# Patient Record
Sex: MALE | Employment: FULL TIME | URBAN - METROPOLITAN AREA
[De-identification: names, ages, dates, MRNs, and addresses within clinical notes are randomized per-mention and may not be internally consistent; named-entity substitution may affect disease eponyms.]

---

## 2018-04-06 ENCOUNTER — OFFICE VISIT (OUTPATIENT)
Dept: FAMILY MEDICINE | Facility: CLINIC | Age: 23
End: 2018-04-06
Payer: COMMERCIAL

## 2018-04-06 VITALS
HEART RATE: 78 BPM | SYSTOLIC BLOOD PRESSURE: 131 MMHG | TEMPERATURE: 97.9 F | HEIGHT: 69 IN | WEIGHT: 151.2 LBS | BODY MASS INDEX: 22.39 KG/M2 | DIASTOLIC BLOOD PRESSURE: 87 MMHG | OXYGEN SATURATION: 99 %

## 2018-04-06 DIAGNOSIS — G44.219 EPISODIC TENSION-TYPE HEADACHE, NOT INTRACTABLE: ICD-10-CM

## 2018-04-06 DIAGNOSIS — Z00.01 ENCOUNTER FOR ROUTINE ADULT MEDICAL EXAM WITH ABNORMAL FINDINGS: Primary | ICD-10-CM

## 2018-04-06 DIAGNOSIS — K21.9 GASTRIC REFLUX: ICD-10-CM

## 2018-04-06 RX ORDER — ACETAMINOPHEN 500 MG
1000 TABLET ORAL EVERY 8 HOURS PRN
Qty: 100 TABLET | Refills: 3 | Status: SHIPPED | OUTPATIENT
Start: 2018-04-06 | End: 2023-09-14

## 2018-04-06 NOTE — PROGRESS NOTES
Male Physical Note          HPI         Concerns today: Headaches, more often when he goes to bed late and sleep is limited.  Sometimes wakes up with headaches. No excessive daytime sleepiness.  Does not snore per patient  A [employer]  was used for  this visit.     There is no problem list on file for this patient.      History reviewed. No pertinent past medical history.    Previous Medical Care   [facility]     Family History   Problem Relation Age of Onset     DIABETES No family hx of      Hypertension No family hx of      Coronary Artery Disease No family hx of      Hyperlipidemia No family hx of      CEREBROVASCULAR DISEASE No family hx of      Breast Cancer No family hx of               Review of Systems:     Review of Systems:  CONSTITUTIONAL: NEGATIVE for fever, chills, change in weight  INTEGUMENTARY/SKIN: NEGATIVE for worrisome rashes, moles or lesions  EYES: NEGATIVE for vision changes or irritation  ENT/MOUTH: NEGATIVE for ear, mouth and throat problems  RESP: NEGATIVE for significant cough or SOB  BREAST: NEGATIVE for masses, tenderness or discharge  CV: NEGATIVE for chest pain, palpitations or peripheral edema  GI: NEGATIVE for nausea, abdominal pain, heartburn, or change in bowel habits  : NEGATIVE for frequency, dysuria, or hematuria  MUSCULOSKELETAL: NEGATIVE for significant arthralgias or myalgia  NEURO: NEGATIVE for weakness, dizziness or paresthesias  ENDOCRINE: NEGATIVE for temperature intolerance, skin/hair changes  HEME/ALLERGY: NEGATIVE for bleeding problems  PSYCHIATRIC: NEGATIVE for changes in mood or affect  Sleep:   Do you snore most or the night (as reported by a family member)? No  Do you feel sleepy or extremely tired during most of the day? No             Social History     Social History     Social History     Marital status: Single     Spouse name: N/A     Number of children: N/A     Years of education: N/A     Occupational History     Not on file.  "    Social History Main Topics     Smoking status: Never Smoker     Smokeless tobacco: Never Used     Alcohol use No     Drug use: No     Sexual activity: No     Other Topics Concern     Not on file     Social History Narrative     No narrative on file       Marital Status:Single  Who lives in your household? Friends/Roomates    Has anyone hurt you physically, for example by pushing, hitting, slapping or kicking you or forcing you to have sex? Denies  Do you feel threatened or controlled by a partner, ex-partner or anyone in your life? Denies    Sexual Health     Sexual concerns: No   STI History: Neg      Recommended Screening     HIV screening:  Testing not indicated, no STI concerns and patient has been screened previously               Physical Exam:     Vitals: /87  Pulse 78  Temp 97.9  F (36.6  C) (Oral)  Ht 5' 9.29\" (176 cm)  Wt 151 lb 3.2 oz (68.6 kg)  SpO2 99%  BMI 22.14 kg/m2  BMI= Body mass index is 22.14 kg/(m^2).  GENERAL: healthy, alert and no distress, high pitched voice due to previous vocal cord injury  EYES: Eyes grossly normal to inspection, extraocular movements - intact, and PERRL  HENT: ear canals- normal; TMs- normal; Nose- normal; Mouth- no ulcers, no lesions  NECK: no tenderness, no adenopathy, no asymmetry, no masses, no stiffness; thyroid- normal to palpation  RESP: lungs clear to auscultation - no rales, no rhonchi, no wheezes  CV: regular rates and rhythm, normal S1 S2, no S3 or S4 and no murmur, no click or rub -  ABDOMEN: soft, no tenderness, no  hepatosplenomegaly, no masses, normal bowel sounds  MS: extremities- no gross deformities noted, no edema  SKIN: no suspicious lesions, no rashes; tracheostomy scar, well healed  NEURO: strength and tone- normal, sensory exam- grossly normal, mentation- intact, speech- normal, reflexes- symmetric  BACK: no CVA tenderness, no paralumbar tenderness  PSYCH: Alert and oriented times 3; speech- coherent , normal rate and volume; able to " articulate logical thoughts, able to abstract reason, no tangential thoughts, no hallucinations or delusions, affect- normal  LYMPHATICS: ant. cervical- normal, post. cervical- normal, axillary- normal, supraclavicular- normal, inguinal- normal    Assessment and Plan      El was seen today for establish care, headache and heartburn.    Diagnoses and all orders for this visit:    Encounter for routine adult medical exam with abnormal findings      1. Health Care Maintenance: Normal Physical Exam  2. Headaches  3. Acid reflux  4. Hx of traumatic vocal cord injury, hx of tracheostomy, Hx of feeding tube    PLAN:  1.Discussed routine safety measures to limit risk of traumatic death including seatbelts and helmet use  2. Discussed diet, exercise, continuing to not smoke  3. Zantac for acid reflux, symptomatic  4. Acetaminophen for headaches; can explore more in depth at follow up appointment if OTC treatment not effective    Options for treatment and follow-up care were reviewed with the patient. El La and/or guardian engaged in the decision making process and verbalized understanding of the options discussed and agreed with the final plan.    Sebastián Yañez MD

## 2018-04-06 NOTE — NURSING NOTE
name: Hibaq  Language: Solomon Islander  Agency: [employer]  Phone number: [phone]   Gissel Benavidez, TIA

## 2018-04-06 NOTE — MR AVS SNAPSHOT
After Visit Summary   4/6/2018    El La    MRN: [id number]           Patient Information     Date Of Birth          1995        Visit Information        Provider Department      4/6/2018 9:40 AM Sebastián Dang MD [facility]        Today's Diagnoses     Encounter for routine adult medical exam with abnormal findings    -  1    Gastric reflux        Episodic tension-type headache, not intractable          Care Instructions    Here is the plan from today's visit    1. Encounter for routine adult medical exam with abnormal findings      2. Gastric reflux  - ranitidine (ZANTAC) 150 MG tablet; Take 1 tablet (150 mg) by mouth 2 times daily  Dispense: 60 tablet; Refill: 1    3. Episodic tension-type headache, not intractable  - acetaminophen (TYLENOL) 500 MG tablet; Take 2 tablets (1,000 mg) by mouth every 8 hours as needed for pain  Dispense: 100 tablet; Refill: 3      Please call or return to clinic if your symptoms don't go away.    Follow up plan  Please make a clinic appointment for follow up with me (SEBASTIÁN DANG) in as needed or in 1 year for annual physical exam  Thank you for coming to [facility] today.  Lab Testing:  **If you had lab testing today and your results are reassuring or normal they will be mailed to you or sent through [employer] within 7 days.   **If the lab tests need quick action we will call you with the results.  The phone number we will call with results is # [phone] (home) . If this is not the best number please call our clinic and change the number.  Medication Refills:  If you need any refills please call your pharmacy and they will contact us.   If you need to  your refill at a new pharmacy, please contact the new pharmacy directly. The new pharmacy will help you get your medications transferred faster.   Scheduling:  If you have any concerns about today's visit or wish to schedule another appointment please  call our office during normal business hours [phone] (8-5:00 M-F)  If a referral was made to a [facility] and you don't get a call from central scheduling please call [phone].  If a Mammogram was ordered for you at [facility] call [phone] to schedule or change your appointment.  If you had an XRay/CT/Ultrasound/MRI ordered the number is [phone] to schedule or change your radiology appointment.   Medical Concerns:  If you have urgent medical concerns please call [phone] at any time of the day.    Sebastián Yañez MD    Preventive Health Recommendations  Male Ages 18 - 25     Yearly exam:             See your health care provider every year in order to  o   Review health changes.   o   Discuss preventive care.    o   Review your medicines if your doctor has prescribed any.    You should be tested each year for STDs (sexually transmitted diseases).     Talk to your provider about cholesterol testing.      If you are at risk for diabetes, you should have a diabetes test (fasting glucose).    Shots: Get a flu shot each year. Get a tetanus shot every 10 years.     Nutrition:    Eat at least 5 servings of fruits and vegetables daily.     Eat whole-grain bread, whole-wheat pasta and brown rice instead of white grains and rice.     Talk to your provider about calcium and Vitamin D.     Lifestyle    Exercise for at least 150 minutes a week (30 minutes a day, 5 days a week). This will help you control your weight and prevent disease.     Limit alcohol to one drink per day.     No smoking.     Wear sunscreen to prevent skin cancer.     See your dentist every six months for an exam and cleaning.             Follow-ups after your visit        Who to contact     Please call your clinic at [phone] to:    Ask questions about your health    Make or cancel appointments    Discuss your medicines    Learn about your test results    Speak to your doctor            Additional  "Information About Your Visit        [employer] Information     [employer] is an electronic gateway that provides easy, online access to your medical records. With [employer], you can request a clinic appointment, read your test results, renew a prescription or communicate with your care team.     To sign up for [employer] visit the website at [email or web address]   You will be asked to enter the access code listed below, as well as some personal information. Please follow the directions to create your username and password.     Your access code is: [email or web address]  Expires: 2018 10:19 AM     Your access code will  in 90 days. If you need help or a new code, please contact your [facility] or call [phone] for assistance.        Care EveryWhere ID     This is your Care EveryWhere ID. This could be used by other organizations to access your [place] medical records  [id number]        Your Vitals Were     Pulse Temperature Height Pulse Oximetry BMI (Body Mass Index)       78 97.9  F (36.6  C) (Oral) 5' 9.29\" (176 cm) 99% 22.14 kg/m2        Blood Pressure from Last 3 Encounters:   18 131/87    Weight from Last 3 Encounters:   18 151 lb 3.2 oz (68.6 kg)              Today, you had the following     No orders found for display         Today's Medication Changes          These changes are accurate as of 18 10:19 AM.  If you have any questions, ask your nurse or doctor.               Start taking these medicines.        Dose/Directions    acetaminophen 500 MG tablet   Commonly known as:  TYLENOL   Used for:  Episodic tension-type headache, not intractable   Started by:  Sebastián Yañez MD        Dose:  1000 mg   Take 2 tablets (1,000 mg) by mouth every 8 hours as needed for pain   Quantity:  100 tablet   Refills:  3       ranitidine 150 MG tablet   Commonly known as:  ZANTAC   Used for:  Gastric reflux   Started by:  Sebastián Yañez MD        Dose:  150 " mg   Take 1 tablet (150 mg) by mouth 2 times daily   Quantity:  180 tablet   Refills:  3            Where to get your medicines      These medications were sent to [facility] [place], [place] - [address] [facility] [address]     Phone:  [phone]     acetaminophen 500 MG tablet    ranitidine 150 MG tablet                Primary Care Provider Fax #    Physician No Ref-Primary [phone]       No address on file        Equal Access to Services     BRY BOBBY : Hadii aad ku hadasho Soomaali, waaxda luqadaha, qaybta kaalmada adeegyada, waxay idiin hayaan adeeg khshukrish laangella . So [facility] [phone].    ATENCIÓN: Si radhala espdrew, tiene a chatterjee disposición servicios gratuitos de asistencia lingüística. Odiname al [phone].    We comply with applicable federal civil rights laws and [place] laws. We do not discriminate on the basis of race, color, national origin, age, disability, sex, sexual orientation, or gender identity.            Thank you!     Thank you for choosing [facility]  for your care. Our goal is always to provide you with excellent care. Hearing back from our patients is one way we can continue to improve our services. Please take a few minutes to complete the written survey that you may receive in the mail after your visit with us. Thank you!             Your Updated Medication List - Protect others around you: Learn how to safely use, store and throw away your medicines at www.disposemymeds.org.          This list is accurate as of 4/6/18 10:19 AM.  Always use your most recent med list.                   Brand Name Dispense Instructions for use Diagnosis    acetaminophen 500 MG tablet    TYLENOL    100 tablet    Take 2 tablets (1,000 mg) by mouth every 8 hours as needed for pain    Episodic tension-type headache, not intractable       ranitidine 150 MG tablet    ZANTAC    180 tablet    Take 1 tablet (150 mg) by mouth 2 times daily     Gastric reflux

## 2018-04-06 NOTE — PROGRESS NOTES
Preceptor Attestation:   Patient seen, evaluated and discussed with the resident. I have verified the content of the note, which accurately reflects my assessment of the patient and the plan of care.   Supervising Physician:  Armond Ramirez MD

## 2018-04-06 NOTE — PATIENT INSTRUCTIONS
Here is the plan from today's visit    1. Encounter for routine adult medical exam with abnormal findings      2. Gastric reflux  - ranitidine (ZANTAC) 150 MG tablet; Take 1 tablet (150 mg) by mouth 2 times daily  Dispense: 60 tablet; Refill: 1    3. Episodic tension-type headache, not intractable  - acetaminophen (TYLENOL) 500 MG tablet; Take 2 tablets (1,000 mg) by mouth every 8 hours as needed for pain  Dispense: 100 tablet; Refill: 3      Please call or return to clinic if your symptoms don't go away.    Follow up plan  Please make a clinic appointment for follow up with me (SMOOTH DANG) in as needed or in 1 year for annual physical exam  Thank you for coming to [facility] today.  Lab Testing:  **If you had lab testing today and your results are reassuring or normal they will be mailed to you or sent through [employer] within 7 days.   **If the lab tests need quick action we will call you with the results.  The phone number we will call with results is # [phone] (home) . If this is not the best number please call our clinic and change the number.  Medication Refills:  If you need any refills please call your pharmacy and they will contact us.   If you need to  your refill at a new pharmacy, please contact the new pharmacy directly. The new pharmacy will help you get your medications transferred faster.   Scheduling:  If you have any concerns about today's visit or wish to schedule another appointment please call our office during normal business hours [phone] (8-5:00 M-F)  If a referral was made to a [facility] and you don't get a call from central scheduling please call [phone].  If a Mammogram was ordered for you at [facility] call [phone] to schedule or change your appointment.  If you had an XRay/CT/Ultrasound/MRI ordered the number is [phone] to schedule or change your radiology appointment.   Medical Concerns:  If you have urgent  medical concerns please call [phone] at any time of the day.    Sebastián Yañez MD    Preventive Health Recommendations  Male Ages 18 - 25     Yearly exam:             See your health care provider every year in order to  o   Review health changes.   o   Discuss preventive care.    o   Review your medicines if your doctor has prescribed any.    You should be tested each year for STDs (sexually transmitted diseases).     Talk to your provider about cholesterol testing.      If you are at risk for diabetes, you should have a diabetes test (fasting glucose).    Shots: Get a flu shot each year. Get a tetanus shot every 10 years.     Nutrition:    Eat at least 5 servings of fruits and vegetables daily.     Eat whole-grain bread, whole-wheat pasta and brown rice instead of white grains and rice.     Talk to your provider about calcium and Vitamin D.     Lifestyle    Exercise for at least 150 minutes a week (30 minutes a day, 5 days a week). This will help you control your weight and prevent disease.     Limit alcohol to one drink per day.     No smoking.     Wear sunscreen to prevent skin cancer.     See your dentist every six months for an exam and cleaning.

## 2018-06-05 ENCOUNTER — HEALTH MAINTENANCE LETTER (OUTPATIENT)
Age: 23
End: 2018-06-05

## 2020-03-06 ENCOUNTER — MEDICAL CORRESPONDENCE (OUTPATIENT)
Dept: HEALTH INFORMATION MANAGEMENT | Facility: CLINIC | Age: 25
End: 2020-03-06

## 2020-03-06 ENCOUNTER — TRANSFERRED RECORDS (OUTPATIENT)
Dept: HEALTH INFORMATION MANAGEMENT | Facility: CLINIC | Age: 25
End: 2020-03-06

## 2020-03-11 NOTE — TELEPHONE ENCOUNTER
FUTURE VISIT INFORMATION      FUTURE VISIT INFORMATION:    Date: 6/9/20    Time: 8:00am    Location: [facility]  REFERRAL INFORMATION:    Referring providers clinic:  [facility]    Reason for visit/diagnosis  has a hard time using his voice well, hoarseness     RECORDS REQUESTED FROM:       Clinic name Comments Records Status Imaging Status   [facility] 3/6/20 Epic/Care Everywhere

## 2020-03-13 ENCOUNTER — TRANSCRIBE ORDERS (OUTPATIENT)
Dept: OTHER | Age: 25
End: 2020-03-13

## 2020-03-13 DIAGNOSIS — Q31.0 GLOTTIC WEB OF LARYNX: Primary | ICD-10-CM

## 2020-06-09 ENCOUNTER — PRE VISIT (OUTPATIENT)
Dept: OTOLARYNGOLOGY | Facility: CLINIC | Age: 25
End: 2020-06-09

## 2022-11-25 ENCOUNTER — HOSPITAL ENCOUNTER (EMERGENCY)
Facility: CLINIC | Age: 27
Discharge: HOME | End: 2022-11-25
Attending: EMERGENCY MEDICINE | Admitting: EMERGENCY MEDICINE
Payer: COMMERCIAL

## 2022-11-25 ENCOUNTER — APPOINTMENT (OUTPATIENT)
Dept: CT IMAGING | Facility: CLINIC | Age: 27
End: 2022-11-25
Attending: EMERGENCY MEDICINE
Payer: COMMERCIAL

## 2022-11-25 VITALS
SYSTOLIC BLOOD PRESSURE: 118 MMHG | HEART RATE: 74 BPM | DIASTOLIC BLOOD PRESSURE: 71 MMHG | TEMPERATURE: 98.2 F | RESPIRATION RATE: 16 BRPM | OXYGEN SATURATION: 99 %

## 2022-11-25 DIAGNOSIS — M54.6 ACUTE BILATERAL THORACIC BACK PAIN: ICD-10-CM

## 2022-11-25 LAB
ALBUMIN SERPL-MCNC: 4.1 G/DL (ref 3.4–5)
ALBUMIN UR-MCNC: NEGATIVE MG/DL
ALP SERPL-CCNC: 67 U/L (ref 40–150)
ALT SERPL W P-5'-P-CCNC: 16 U/L (ref 0–70)
ANION GAP SERPL CALCULATED.3IONS-SCNC: 4 MMOL/L (ref 3–14)
APPEARANCE UR: CLEAR
AST SERPL W P-5'-P-CCNC: 15 U/L (ref 0–45)
BASOPHILS # BLD AUTO: 0 10E3/UL (ref 0–0.2)
BASOPHILS NFR BLD AUTO: 1 %
BILIRUB SERPL-MCNC: 0.4 MG/DL (ref 0.2–1.3)
BILIRUB UR QL STRIP: NEGATIVE
BUN SERPL-MCNC: 15 MG/DL (ref 7–30)
CALCIUM SERPL-MCNC: 9.4 MG/DL (ref 8.5–10.1)
CHLORIDE BLD-SCNC: 107 MMOL/L (ref 94–109)
CO2 SERPL-SCNC: 26 MMOL/L (ref 20–32)
COLOR UR AUTO: ABNORMAL
CREAT SERPL-MCNC: 0.9 MG/DL (ref 0.66–1.25)
EOSINOPHIL # BLD AUTO: 0.1 10E3/UL (ref 0–0.7)
EOSINOPHIL NFR BLD AUTO: 3 %
ERYTHROCYTE [DISTWIDTH] IN BLOOD BY AUTOMATED COUNT: 12.3 % (ref 10–15)
GFR SERPL CREATININE-BSD FRML MDRD: >90 ML/MIN/1.73M2
GLUCOSE BLD-MCNC: 93 MG/DL (ref 70–99)
GLUCOSE UR STRIP-MCNC: NEGATIVE MG/DL
HCT VFR BLD AUTO: 42.2 % (ref 40–53)
HGB BLD-MCNC: 14.5 G/DL (ref 13.3–17.7)
HGB UR QL STRIP: NEGATIVE
IMM GRANULOCYTES # BLD: 0 10E3/UL
IMM GRANULOCYTES NFR BLD: 0 %
KETONES UR STRIP-MCNC: NEGATIVE MG/DL
LEUKOCYTE ESTERASE UR QL STRIP: NEGATIVE
LIPASE SERPL-CCNC: 181 U/L (ref 73–393)
LYMPHOCYTES # BLD AUTO: 1.5 10E3/UL (ref 0.8–5.3)
LYMPHOCYTES NFR BLD AUTO: 46 %
MCH RBC QN AUTO: 29.7 PG (ref 26.5–33)
MCHC RBC AUTO-ENTMCNC: 34.4 G/DL (ref 31.5–36.5)
MCV RBC AUTO: 86 FL (ref 78–100)
MONOCYTES # BLD AUTO: 0.3 10E3/UL (ref 0–1.3)
MONOCYTES NFR BLD AUTO: 9 %
MUCOUS THREADS #/AREA URNS LPF: PRESENT /LPF
NEUTROPHILS # BLD AUTO: 1.4 10E3/UL (ref 1.6–8.3)
NEUTROPHILS NFR BLD AUTO: 41 %
NITRATE UR QL: NEGATIVE
NRBC # BLD AUTO: 0 10E3/UL
NRBC BLD AUTO-RTO: 0 /100
PH UR STRIP: 5.5 [PH] (ref 5–7)
PLATELET # BLD AUTO: 226 10E3/UL (ref 150–450)
POTASSIUM BLD-SCNC: 4 MMOL/L (ref 3.4–5.3)
PROT SERPL-MCNC: 7.6 G/DL (ref 6.8–8.8)
RBC # BLD AUTO: 4.89 10E6/UL (ref 4.4–5.9)
RBC URINE: 0 /HPF
SODIUM SERPL-SCNC: 137 MMOL/L (ref 133–144)
SP GR UR STRIP: 1.02 (ref 1–1.03)
UROBILINOGEN UR STRIP-MCNC: NORMAL MG/DL
WBC # BLD AUTO: 3.3 10E3/UL (ref 4–11)
WBC URINE: 1 /HPF

## 2022-11-25 PROCEDURE — 99284 EMERGENCY DEPT VISIT MOD MDM: CPT | Mod: 25 | Performed by: EMERGENCY MEDICINE

## 2022-11-25 PROCEDURE — 85025 COMPLETE CBC W/AUTO DIFF WBC: CPT | Performed by: EMERGENCY MEDICINE

## 2022-11-25 PROCEDURE — 80053 COMPREHEN METABOLIC PANEL: CPT | Performed by: EMERGENCY MEDICINE

## 2022-11-25 PROCEDURE — 81001 URINALYSIS AUTO W/SCOPE: CPT | Performed by: EMERGENCY MEDICINE

## 2022-11-25 PROCEDURE — 82040 ASSAY OF SERUM ALBUMIN: CPT | Performed by: EMERGENCY MEDICINE

## 2022-11-25 PROCEDURE — 36415 COLL VENOUS BLD VENIPUNCTURE: CPT | Performed by: EMERGENCY MEDICINE

## 2022-11-25 PROCEDURE — 99284 EMERGENCY DEPT VISIT MOD MDM: CPT | Performed by: EMERGENCY MEDICINE

## 2022-11-25 PROCEDURE — 74176 CT ABD & PELVIS W/O CONTRAST: CPT

## 2022-11-25 PROCEDURE — 83690 ASSAY OF LIPASE: CPT | Performed by: EMERGENCY MEDICINE

## 2022-11-25 ASSESSMENT — ACTIVITIES OF DAILY LIVING (ADL): ADLS_ACUITY_SCORE: 35

## 2022-11-25 NOTE — ED TRIAGE NOTES
Triage Assessment     Row Name 11/25/22 0737       Triage Assessment (Adult)    Airway WDL WDL       Respiratory WDL    Respiratory WDL WDL       Skin Circulation/Temperature WDL    Skin Circulation/Temperature WDL WDL       Cardiac WDL    Cardiac WDL WDL       Peripheral/Neurovascular WDL    Peripheral Neurovascular WDL WDL       Cognitive/Neuro/Behavioral WDL    Cognitive/Neuro/Behavioral WDL WDL
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
## 2022-11-25 NOTE — DISCHARGE INSTRUCTIONS
The cause of your pain is not clear.  Your laboratory studies are normal.  Your CT does not show any acute findings.  You may have back pain from driving.  Sometimes the discs between the spine can push on your nerves and cause pain.  It may help to improve your core muscles.  Use ibuprofen with food or Tylenol as needed for pain.  When you are driving long distances, it may help to take a break and stretch your back.  Follow-up with your regular doctor for reevaluation.

## 2023-02-22 ENCOUNTER — TELEPHONE (OUTPATIENT)
Dept: OPHTHALMOLOGY | Facility: CLINIC | Age: 28
End: 2023-02-22
Payer: COMMERCIAL

## 2023-03-27 ENCOUNTER — HOSPITAL ENCOUNTER (EMERGENCY)
Facility: CLINIC | Age: 28
Discharge: HOME | End: 2023-03-27
Attending: EMERGENCY MEDICINE | Admitting: EMERGENCY MEDICINE
Payer: COMMERCIAL

## 2023-03-27 VITALS
SYSTOLIC BLOOD PRESSURE: 123 MMHG | TEMPERATURE: 97.8 F | RESPIRATION RATE: 18 BRPM | DIASTOLIC BLOOD PRESSURE: 78 MMHG | HEART RATE: 60 BPM | OXYGEN SATURATION: 100 %

## 2023-03-27 DIAGNOSIS — L30.9 DERMATITIS: ICD-10-CM

## 2023-03-27 DIAGNOSIS — L29.3 PRURITUS OF GENITAL ORGANS: ICD-10-CM

## 2023-03-27 PROCEDURE — 99282 EMERGENCY DEPT VISIT SF MDM: CPT | Performed by: EMERGENCY MEDICINE

## 2023-03-27 PROCEDURE — 99283 EMERGENCY DEPT VISIT LOW MDM: CPT | Performed by: EMERGENCY MEDICINE

## 2023-03-27 RX ORDER — DIPHENHYDRAMINE HCL 25 MG
25 TABLET ORAL EVERY 6 HOURS PRN
Qty: 30 TABLET | Refills: 3 | Status: SHIPPED | OUTPATIENT
Start: 2023-03-27 | End: 2023-09-14

## 2023-03-27 ASSESSMENT — ACTIVITIES OF DAILY LIVING (ADL): ADLS_ACUITY_SCORE: 33

## 2023-03-27 NOTE — ED TRIAGE NOTES
Pt ambulatory to triage with complaint of itchy rash that has been on his legs, groin area, and lower abd. States it has been here for 3 years. Came to ER today because it is not getting any better. VSS     Triage Assessment     Row Name 03/27/23 1250       Triage Assessment (Adult)    Airway WDL WDL       Respiratory WDL    Respiratory WDL WDL       Skin Circulation/Temperature WDL    Skin Circulation/Temperature WDL X  skin rash to BLE, groin, and lower abd area       Cardiac WDL    Cardiac WDL WDL       Peripheral/Neurovascular WDL    Peripheral Neurovascular WDL WDL       Cognitive/Neuro/Behavioral WDL    Cognitive/Neuro/Behavioral WDL WDL

## 2023-03-27 NOTE — DISCHARGE INSTRUCTIONS
Follow-up with your primary care provider.  Please make an appointment to follow [facility] with [facility] (phone: [phone]) as soon as possible as needed.
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
## 2023-04-07 ENCOUNTER — LAB (OUTPATIENT)
Dept: LAB | Facility: CLINIC | Age: 28
End: 2023-04-07
Payer: COMMERCIAL

## 2023-04-07 ENCOUNTER — OFFICE VISIT (OUTPATIENT)
Dept: INTERNAL MEDICINE | Facility: CLINIC | Age: 28
End: 2023-04-07
Payer: COMMERCIAL

## 2023-04-07 VITALS
HEIGHT: 69 IN | DIASTOLIC BLOOD PRESSURE: 76 MMHG | BODY MASS INDEX: 22.14 KG/M2 | HEART RATE: 61 BPM | OXYGEN SATURATION: 100 % | SYSTOLIC BLOOD PRESSURE: 116 MMHG

## 2023-04-07 DIAGNOSIS — B35.6 TINEA CRURIS: Primary | ICD-10-CM

## 2023-04-07 DIAGNOSIS — Z13.6 CARDIOVASCULAR SCREENING; LDL GOAL LESS THAN 160: ICD-10-CM

## 2023-04-07 LAB
ALBUMIN SERPL BCG-MCNC: 4.8 G/DL (ref 3.5–5.2)
ALP SERPL-CCNC: 73 U/L (ref 40–129)
ALT SERPL W P-5'-P-CCNC: 10 U/L (ref 10–50)
ANION GAP SERPL CALCULATED.3IONS-SCNC: 7 MMOL/L (ref 7–15)
AST SERPL W P-5'-P-CCNC: 21 U/L (ref 10–50)
BILIRUB SERPL-MCNC: 0.6 MG/DL
BUN SERPL-MCNC: 10.2 MG/DL (ref 6–20)
CALCIUM SERPL-MCNC: 9.7 MG/DL (ref 8.6–10)
CHLORIDE SERPL-SCNC: 105 MMOL/L (ref 98–107)
CHOLEST SERPL-MCNC: 170 MG/DL
CREAT SERPL-MCNC: 0.93 MG/DL (ref 0.67–1.17)
DEPRECATED HCO3 PLAS-SCNC: 28 MMOL/L (ref 22–29)
GFR SERPL CREATININE-BSD FRML MDRD: >90 ML/MIN/1.73M2
GLUCOSE SERPL-MCNC: 99 MG/DL (ref 70–99)
HDLC SERPL-MCNC: 41 MG/DL
LDLC SERPL CALC-MCNC: 117 MG/DL
NONHDLC SERPL-MCNC: 129 MG/DL
POTASSIUM SERPL-SCNC: 4.2 MMOL/L (ref 3.4–5.3)
PROT SERPL-MCNC: 7.7 G/DL (ref 6.4–8.3)
SODIUM SERPL-SCNC: 140 MMOL/L (ref 136–145)
TRIGL SERPL-MCNC: 62 MG/DL

## 2023-04-07 PROCEDURE — 36415 COLL VENOUS BLD VENIPUNCTURE: CPT | Performed by: PATHOLOGY

## 2023-04-07 PROCEDURE — 80061 LIPID PANEL: CPT | Performed by: PATHOLOGY

## 2023-04-07 PROCEDURE — 99203 OFFICE O/P NEW LOW 30 MIN: CPT | Performed by: HOSPITALIST

## 2023-04-07 PROCEDURE — 80053 COMPREHEN METABOLIC PANEL: CPT | Performed by: PATHOLOGY

## 2023-04-07 RX ORDER — BENZOCAINE/MENTHOL 6 MG-10 MG
LOZENGE MUCOUS MEMBRANE 2 TIMES DAILY
Qty: 30 G | Refills: 1 | Status: SHIPPED | OUTPATIENT
Start: 2023-04-07 | End: 2023-09-14

## 2023-04-07 RX ORDER — CLOTRIMAZOLE 1 %
CREAM (GRAM) TOPICAL 2 TIMES DAILY
Qty: 30 G | Refills: 1 | Status: SHIPPED | OUTPATIENT
Start: 2023-04-07 | End: 2023-05-19

## 2023-04-07 ASSESSMENT — ENCOUNTER SYMPTOMS
SHORTNESS OF BREATH: 0
DIARRHEA: 0
CHILLS: 0
DYSURIA: 0
CONSTIPATION: 0
FEVER: 0
ABDOMINAL PAIN: 0

## 2023-04-07 NOTE — NURSING NOTE
"El La is a 27 year old male patient that presents today in clinic for the following:    Chief Complaint   Patient presents with     Establish Care     Derm Problem      [facility] Follow Up for Pruritus, itching, rash around genital organs, w/Zach Funes DO, on: 3/27/2023. Pt was given medication but reports it has not been working at all.     Physical     Blood Draw     Pt interested in routine labs, especially for cholestorol     The patient's allergies and medications were reviewed as noted. A set of vitals were recorded as noted without incident: /76 (BP Location: Right arm, Patient Position: Sitting, Cuff Size: Adult Regular)   Pulse 61   Ht 1.76 m (5' 9.29\")   SpO2 100%   BMI 22.14 kg/m  . The patient does not have any other questions for the provider.    Brenden Chaudhry, EMT 3:14 PM on 4/7/2023   "
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
## 2023-04-07 NOTE — PATIENT INSTRUCTIONS
- Labs for Lipid and CMP today.   - Use Clotrimazole (antifungal) twice a day mixed with hydrocortisone (steroid) for 2 weeks.   - May     Follow up as needed. Otherwise recheck again in 1 year.

## 2023-04-13 PROBLEM — Z13.6 CARDIOVASCULAR SCREENING; LDL GOAL LESS THAN 160: Status: ACTIVE | Noted: 2023-04-13

## 2023-04-13 PROBLEM — B35.6 TINEA CRURIS: Status: ACTIVE | Noted: 2023-04-13

## 2023-05-19 ENCOUNTER — OFFICE VISIT (OUTPATIENT)
Dept: INTERNAL MEDICINE | Facility: CLINIC | Age: 28
End: 2023-05-19
Payer: COMMERCIAL

## 2023-05-19 ENCOUNTER — LAB (OUTPATIENT)
Dept: LAB | Facility: CLINIC | Age: 28
End: 2023-05-19
Payer: COMMERCIAL

## 2023-05-19 VITALS
WEIGHT: 154.7 LBS | DIASTOLIC BLOOD PRESSURE: 81 MMHG | OXYGEN SATURATION: 99 % | HEART RATE: 85 BPM | BODY MASS INDEX: 22.65 KG/M2 | SYSTOLIC BLOOD PRESSURE: 116 MMHG

## 2023-05-19 DIAGNOSIS — R21 RASH IN ADULT: Primary | ICD-10-CM

## 2023-05-19 DIAGNOSIS — R21 RASH IN ADULT: ICD-10-CM

## 2023-05-19 DIAGNOSIS — R10.13 EPIGASTRIC PAIN: ICD-10-CM

## 2023-05-19 PROCEDURE — 36415 COLL VENOUS BLD VENIPUNCTURE: CPT | Performed by: PATHOLOGY

## 2023-05-19 PROCEDURE — 99000 SPECIMEN HANDLING OFFICE-LAB: CPT | Performed by: PATHOLOGY

## 2023-05-19 PROCEDURE — 99214 OFFICE O/P EST MOD 30 MIN: CPT | Performed by: HOSPITALIST

## 2023-05-19 PROCEDURE — 82785 ASSAY OF IGE: CPT | Mod: 90 | Performed by: PATHOLOGY

## 2023-05-19 NOTE — NURSING NOTE
El La is a 27 year old male that presents in clinic today for the following:     Chief Complaint   Patient presents with     Derm Problem     Pt here for rash       The patient's allergies and medications were reviewed. The patient's vitals were obtained without incident. The patient does not have any other questions for the provider.     Ernst Bear, EMT at 4:38 PM on 5/19/2023.  [facility]: [phone]
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
## 2023-05-19 NOTE — PATIENT INSTRUCTIONS
- Start on Omeprazole daily for 1 month, then as needed after.   - Obtain IGE lab. If elevated, may consider referral to allergist.   - Referral to dermatology.     Follow up again in 2 months.

## 2023-05-23 DIAGNOSIS — R21 RASH IN ADULT: Primary | ICD-10-CM

## 2023-05-23 LAB — IGE SERPL-ACNC: 189 KU/L (ref 0–114)

## 2023-05-26 NOTE — TELEPHONE ENCOUNTER
FUTURE VISIT INFORMATION      FUTURE VISIT INFORMATION:    Date: 8.1.23    Time: 7:00    [place] [facility] [place] INFORMATION:    Referring provider:  Gil    Referring providers clinic:  [facility]    Reason for visit/diagnosis  Allergy may be causing rash, per pt (speaks English as well-declined to get a  for scheduling, referred by     Ranulfo [place]ordero [place] [facility] [place]    RECORDS REQUESTED FROM:       Clinic name Comments Records Status Imaging Status   [facility] 5.23.23, 5.19.23, 4.7.23  [facility] 3.27.23  [facility]    [facility] D[facility] 10.5.22  Monisha CE    [facility] 9.27.22  Byron EGAN

## 2023-05-29 PROBLEM — R21 RASH IN ADULT: Status: ACTIVE | Noted: 2023-05-29

## 2023-05-29 PROBLEM — B35.6 TINEA CRURIS: Status: RESOLVED | Noted: 2023-04-13 | Resolved: 2023-05-29

## 2023-05-29 PROBLEM — R10.13 EPIGASTRIC PAIN: Status: ACTIVE | Noted: 2023-05-29

## 2023-05-29 ASSESSMENT — ENCOUNTER SYMPTOMS
SHORTNESS OF BREATH: 0
HEARTBURN: 0
DYSURIA: 0
FREQUENCY: 0
CHILLS: 0
DIARRHEA: 0
FEVER: 0
CONSTIPATION: 0
ABDOMINAL PAIN: 1

## 2023-05-29 NOTE — ASSESSMENT & PLAN NOTE
No benefit with antifungal with hydrocortisone cream.   - Obtain IGE lab. If elevated, may consider referral to allergist.   - Referral to dermatology.   - May continue hydrocortisone cream prn.  - Benadryl prn.

## 2023-07-07 ENCOUNTER — OFFICE VISIT (OUTPATIENT)
Dept: INTERNAL MEDICINE | Facility: CLINIC | Age: 28
End: 2023-07-07
Payer: COMMERCIAL

## 2023-07-07 VITALS
DIASTOLIC BLOOD PRESSURE: 72 MMHG | OXYGEN SATURATION: 98 % | BODY MASS INDEX: 23.73 KG/M2 | HEART RATE: 77 BPM | WEIGHT: 160.2 LBS | SYSTOLIC BLOOD PRESSURE: 108 MMHG | HEIGHT: 69 IN

## 2023-07-07 DIAGNOSIS — R10.13 EPIGASTRIC PAIN: ICD-10-CM

## 2023-07-07 DIAGNOSIS — R21 RASH IN ADULT: Primary | ICD-10-CM

## 2023-07-07 PROCEDURE — 99213 OFFICE O/P EST LOW 20 MIN: CPT | Performed by: HOSPITALIST

## 2023-07-07 NOTE — PATIENT INSTRUCTIONS
- Call to schedule a dermatology appointment at [phone].  - Keep follow up with Allergy on 8/1/23.   - Continued hydrocortisone cream to rash to help with itching.   - May apply voltaren gel   - May continue omeprazole as needed.       Follow up again in 3 month.  Prefers to see an international doctor.
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
## 2023-07-07 NOTE — NURSING NOTE
"El La is a 27 year old male patient that presents today in clinic for the following:    Chief Complaint   Patient presents with     Follow Up     Pt here for 2 month follow up     The patient's allergies and medications were reviewed as noted. A set of vitals were recorded as noted without incident: /72 (BP Location: Right arm, Patient Position: Sitting, Cuff Size: Adult Regular)   Pulse 77   Ht 1.76 m (5' 9.29\")   Wt 72.7 kg (160 lb 3.2 oz)   SpO2 98%   BMI 23.46 kg/m  . The patient does not have any other questions for the provider.    Kimmie Braun, EMT at 4:04 PM on 7/7/2023  "

## 2023-07-08 ASSESSMENT — ENCOUNTER SYMPTOMS
CONSTIPATION: 0
DIARRHEA: 0
ABDOMINAL PAIN: 0
SHORTNESS OF BREATH: 0
HEARTBURN: 0
CHILLS: 0
DYSURIA: 0
FEVER: 0

## 2023-07-08 NOTE — ASSESSMENT & PLAN NOTE
No benefit from antifungal with hydrocortisone cream. IgE is elevated to 189. Continued skin changes without ulceration along groin and left calf.   - Patient to call to schedule a dermatology appointment.  - Keep follow up with Allergy on 8/1/23.   - Continued hydrocortisone cream to rash to help with itching.   - May apply voltaren gel

## 2023-07-30 NOTE — PROGRESS NOTES
[facility] Dermato-allergology Note  Office visit  Encounter Date: Aug 1, 2023  ____________________________________________    CC: Rash on legs and groin    HPI:  (Aug 1, 2023)  Mr. El La is a(n) 27 year old male who presents today as new patient for allergy consultation  - itchy rash on the calves and the genital area for 3 years that has  - tried benadryl without relief  - tried hydrocortisone and some other cream? Without relief. Reports a medication from [place] was helpful for itching relief, but is unsure what the medication was  - feels that the rash goes away when he visits [facility], possibly due to weather  - stopped using products with perfumes  - started to eat fish, vegetables, and rice diet to try and stop itch and lower lipids  - otherwise feeling well in usual state of health    Physical exam:  General: In no acute distress, well-developed, well-nourished  Eyes: no conjunctivitis  ENT: no signs of rhinitis   Pulmonary: no wheezing or coughing  Skin: Focused examination of the skin on test sites was performed = see test results below  Focused examination of the axilla, lower extremities and groin was performed.  - hyperpigmentation with some grouped papules without desquamation inguinal area  - dry skin and hyperpigmentations with some keratosis pilaris on calf area      Past Medical History:   Patient Active Problem List   Diagnosis     CARDIOVASCULAR SCREENING; LDL GOAL LESS THAN 160     Epigastric pain     Rash in adult     No past medical history on file.    Allergies:  No Known Allergies    Medications:  Current Outpatient Medications   Medication     diclofenac (VOLTAREN) 1 % topical gel     emollient (VANICREAM) external cream     hydrocortisone (CORTAID) 1 % external cream     tacrolimus (PROTOPIC) 0.03 % external ointment     [START ON 8/3/2023] triamcinolone (KENALOG) 0.1 % external cream     acetaminophen (TYLENOL) 500 MG tablet     diphenhydrAMINE (BENADRYL)  25 MG tablet     omeprazole (PRILOSEC) 20 MG DR capsule     No current facility-administered medications for this visit.       Social History:  The patient works as a . Patient has the following hobbies or non-occupational exposure includes soccer.    Family History:  Family History   Problem Relation Age of Onset     Asthma Father      Diabetes No family hx of      Hypertension No family hx of      Coronary Artery Disease No family hx of      Hyperlipidemia No family hx of      Cerebrovascular Disease No family hx of      Breast Cancer No family hx of        Previous Labs, Allergy Tests, Dermatopathology, Imaging:  none    Referred By: Ranulfo Cordero MD  [address] [place] [id number]     Allergy Tests:    Past Allergy Test    Order for Future Allergy Testing:    [x] Outpatient  [] Inpatient: [facility]..../ Bed ....       Skin Atopy (atopic dermatitis) [] Yes   [x] No .........  Contact allergies:   [] Yes   [x] No ..........  Hand eczema:   [] Yes   [x] No           Leading hand:   [] R   [] L       [] Ambidextrous         Drug allergies:        [] Yes   [x] No  which?......    Urticaria/Angioedema  [x] Yes   [] No .........  Food Allergy:  [] Yes    No  which?......  Pets :  [] Yes   [x] No  which?......         []  Rhinitis   [] Conjunctivitis   [] Sinusitis   [] Polyposis   [] Otitis   [] Pharyngitis         []  Postnasal drip    [x]  none  Operations:   [] Tonsils   [] Septum   [] Sinus   [] Polyposis        [] Asthma bronchiale   [] Coughing      [x]  none  Symptoms (mostly Rhinoconjunctivitis and Asthma) aggravated by:  Season   [x] I   [x] II   [] III   [] IV   []V   []VI   []VII   []VIII   []IX   []X   [x]XI   [x]XII     [] perennial  Rashes worse in winter  Day time      [] morning   [] noon      [] evening        [x] night    [] whole day........  []  none  Location/changes    [] inside        [] outside   [] mountains    [] sea     [] others.............   [x]  none  Triggers,  specific     [] animals     [] plants     [] dust              [] others ...........................    [x]  none  Triggers, others       [] work          [] psyche    [] sport            [] others .............................  [x]  none  Irritant                [] phys efforts [] smoke    [] heat/cold     [] odors  []others............... [x]  none    Order for PATCH TESTS  Reason for tests (suspected allergy): not yet necessary  Known previous allergies: n/a  Standardized panels  [x] Standard panel (40 tests)  [x] Preservatives & Antimicrobials (31 tests)  [x] Emulsifiers & Additives (25 tests)   [x] Perfumes/Flavours & Plants (25 tests)  [] Hairdresser panel (12 tests)  [] Rubber Chemicals (22 tests)  [] Plastics (26 tests)  [] Colorants/Dyes/Food additives (20 tests)  [] Metals (implants/dental) (24 tests)  [] Local anaesthetics/NSAIDs (13 tests)  [x] Antibiotics & Antimycotics (14 tests)   [] Corticosteroids (15 tests)   [] Photopatch test (62 tests)   [] others: ...      [] Patient's own products: ...    DO NOT test if chemical or biological identity is unknown!     always ask from patient the product information and safety sheets (MSDS)       Order for PRICK TESTS    Reason for tests (suspected allergy): no signs for atopy --> only to be done if recurrences  Known previous allergies: n/a    Standardized prick panels  [x] Atopic panel (20 tests)  [] Pediatric Panel (12 tests)  [] Milk, Meat, Eggs, Grains (20 tests)   [] Dust, Epithelia, Feathers (10 tests)  [] Fish, Seafood, Shellfish (17 tests)  [] Nuts, Beans (14 tests)  [] Spice, Vegetable, Fruit (17 tests)  [] Pollen Panel = Tree, Grass, Weed (24 tests)  [] Others: ...      [] Patient's own products: ...    DO NOT test if chemical or biological identity is unknown!     always ask from patient the product information and safety sheets (MSDS)     Standardized intradermal tests  [] Penicillium notatum [] Aspergillus fumigatus [] House dust mites D.far & D.  pteron  [] Cat    [] dog  [] Others: ...  [] Bee venom   [] Wasp venom  !!Specific protocol with dilutions!!       Order for Drug allergy tests (prick & Intradermal & patch tests)    [] Penicillin G  [] Ampicillin [] Cefazolin   [] Ceftriaxone   [] Ceftazidime  [] Bactrim    [] Others: ...  Order for ... as test date    [] Patient needs consultation with Allergy team (changes of tests may apply)  [x] Tests discussed with Allergy team (can have direct appointment for allergy tests)     ________________________________    Assessment & Plan:    ==> Final Diagnosis:     # recurrent postinflammatory hyperpigmentations with eczematous lesions inguinal and on calfs on dry skin  DDx atopic dermatitis with signs of keratosis pilaris and  Irritant contact dermatitis   DDx allergic contact dermatitis  DDx tinea cruris? --> no clear desquamation and signs for tinea      These conclusions are made at the best of one's knowledge and belief based on the provided evidence such as patient's history and allergy test results and they can change over time or can be incomplete because of missing information's.    ==> Treatment Plan:  - Vanicream soap  - Vanicream cream  - Triamcinolone 0.1 % cream on weekends  - Protopic 0.03 % 1-2 times daily Monday through Friday      Staff:  Provider    Tammy Moyer, MS3    Follow-up primarily in Dermatology in about 6 weeks and if no improvement back to Dermato-Allergy for allergy tests as planned    I spent a total of 30 minutes with El La. This time was spent counseling the patient and/or coordinating care, explaining the allergy tests

## 2023-08-01 ENCOUNTER — OFFICE VISIT (OUTPATIENT)
Dept: ALLERGY | Facility: CLINIC | Age: 28
End: 2023-08-01
Attending: HOSPITALIST
Payer: COMMERCIAL

## 2023-08-01 ENCOUNTER — PRE VISIT (OUTPATIENT)
Dept: ALLERGY | Facility: CLINIC | Age: 28
End: 2023-08-01

## 2023-08-01 DIAGNOSIS — L24.9 IRRITANT DERMATITIS: Primary | ICD-10-CM

## 2023-08-01 DIAGNOSIS — L20.89 OTHER ATOPIC DERMATITIS: ICD-10-CM

## 2023-08-01 DIAGNOSIS — L85.8 KERATOSIS PILARIS: ICD-10-CM

## 2023-08-01 DIAGNOSIS — L23.89 ALLERGIC CONTACT DERMATITIS DUE TO OTHER AGENTS: ICD-10-CM

## 2023-08-01 PROCEDURE — 99203 OFFICE O/P NEW LOW 30 MIN: CPT | Performed by: DERMATOLOGY

## 2023-08-01 RX ORDER — EMOLLIENT BASE
CREAM (GRAM) TOPICAL 2 TIMES DAILY
Qty: 453 G | Refills: 3 | Status: SHIPPED | OUTPATIENT
Start: 2023-08-01 | End: 2023-09-14

## 2023-08-01 RX ORDER — TACROLIMUS 0.3 MG/G
OINTMENT TOPICAL
Qty: 60 G | Refills: 3 | Status: SHIPPED | OUTPATIENT
Start: 2023-08-01 | End: 2023-09-14

## 2023-08-01 RX ORDER — TRIAMCINOLONE ACETONIDE 1 MG/G
CREAM TOPICAL
Qty: 30 G | Refills: 1 | Status: SHIPPED | OUTPATIENT
Start: 2023-08-03 | End: 2023-09-14

## 2023-08-01 ASSESSMENT — PAIN SCALES - GENERAL: PAINLEVEL: NO PAIN (0)

## 2023-08-01 NOTE — PATIENT INSTRUCTIONS
- Vanicream soap  - Vanicream cream  - Triamcinolone 0.1 % cream on weekends  - Protopic 0.03 % 1-2 times daily Monday through Friday  - See dermatologist in 4-6 weeks, return for patch testing if needed

## 2023-08-01 NOTE — LETTER
8/1/2023         RE: El La  [address] [address]  [facility] [address]        Dear Colleague,    Thank you for referring your patient, El La, to the [facility] [place]. Please see a copy of my visit note below.    [facility] Dermato-allergology Note  Office visit  Encounter Date: Aug 1, 2023  ____________________________________________    CC: Rash on legs and groin    HPI:  (Aug 1, 2023)  Mr. El La is a(n) 27 year old male who presents today as new patient for allergy consultation  - itchy rash on the calves and the genital area for 3 years that has  - tried benadryl without relief  - tried hydrocortisone and some other cream? Without relief. Reports a medication from [place] was helpful for itching relief, but is unsure what the medication was  - feels that the rash goes away when he visits [facility], possibly due to weather  - stopped using products with perfumes  - started to eat fish, vegetables, and rice diet to try and stop itch and lower lipids  - otherwise feeling well in usual state of health    Physical exam:  General: In no acute distress, well-developed, well-nourished  Eyes: no conjunctivitis  ENT: no signs of rhinitis   Pulmonary: no wheezing or coughing  Skin: Focused examination of the skin on test sites was performed = see test results below  Focused examination of the axilla, lower extremities and groin was performed.  - hyperpigmentation with some grouped papules without desquamation inguinal area  - dry skin and hyperpigmentations with some keratosis pilaris on calf area      Past Medical History:   Patient Active Problem List   Diagnosis     CARDIOVASCULAR SCREENING; LDL GOAL LESS THAN 160     Epigastric pain     Rash in adult     No past medical history on file.    Allergies:  No Known Allergies    Medications:  Current Outpatient Medications   Medication     diclofenac (VOLTAREN) 1 % topical gel     emollient  (VANICREAM) external cream     hydrocortisone (CORTAID) 1 % external cream     tacrolimus (PROTOPIC) 0.03 % external ointment     [START ON 8/3/2023] triamcinolone (KENALOG) 0.1 % external cream     acetaminophen (TYLENOL) 500 MG tablet     diphenhydrAMINE (BENADRYL) 25 MG tablet     omeprazole (PRILOSEC) 20 MG DR capsule     No current facility-administered medications for this visit.       Social History:  The patient works as a . Patient has the following hobbies or non-occupational exposure includes soccer.    Family History:  Family History   Problem Relation Age of Onset     Asthma Father      Diabetes No family hx of      Hypertension No family hx of      Coronary Artery Disease No family hx of      Hyperlipidemia No family hx of      Cerebrovascular Disease No family hx of      Breast Cancer No family hx of        Previous Labs, Allergy Tests, Dermatopathology, Imaging:  none    Referred By: Ranulfo Cordero MD  [address] [place] [id number]     Allergy Tests:    Past Allergy Test    Order for Future Allergy Testing:    [x] Outpatient  [] Inpatient: [facility]..../ Bed ....       Skin Atopy (atopic dermatitis) [] Yes   [x] No .........  Contact allergies:   [] Yes   [x] No ..........  Hand eczema:   [] Yes   [x] No           Leading hand:   [] R   [] L       [] Ambidextrous         Drug allergies:        [] Yes   [x] No  which?......    Urticaria/Angioedema  [x] Yes   [] No .........  Food Allergy:  [] Yes    No  which?......  Pets :  [] Yes   [x] No  which?......         []  Rhinitis   [] Conjunctivitis   [] Sinusitis   [] Polyposis   [] Otitis   [] Pharyngitis         []  Postnasal drip    [x]  none  Operations:   [] Tonsils   [] Septum   [] Sinus   [] Polyposis        [] Asthma bronchiale   [] Coughing      [x]  none  Symptoms (mostly Rhinoconjunctivitis and Asthma) aggravated by:  Season   [x] I   [x] II   [] III   [] IV   []V   []VI   []VII   []VIII   []IX   []X   [x]XI   [x]XII      [] perennial  Rashes worse in winter  Day time      [] morning   [] noon      [] evening        [x] night    [] whole day........  []  none  Location/changes    [] inside        [] outside   [] mountains    [] sea     [] others.............   [x]  none  Triggers, specific     [] animals     [] plants     [] dust              [] others ...........................    [x]  none  Triggers, others       [] work          [] psyche    [] sport            [] others .............................  [x]  none  Irritant                [] phys efforts [] smoke    [] heat/cold     [] odors  []others............... [x]  none    Order for PATCH TESTS  Reason for tests (suspected allergy): not yet necessary  Known previous allergies: n/a  Standardized panels  [x] Standard panel (40 tests)  [x] Preservatives & Antimicrobials (31 tests)  [x] Emulsifiers & Additives (25 tests)   [x] Perfumes/Flavours & Plants (25 tests)  [] Hairdresser panel (12 tests)  [] Rubber Chemicals (22 tests)  [] Plastics (26 tests)  [] Colorants/Dyes/Food additives (20 tests)  [] Metals (implants/dental) (24 tests)  [] Local anaesthetics/NSAIDs (13 tests)  [x] Antibiotics & Antimycotics (14 tests)   [] Corticosteroids (15 tests)   [] Photopatch test (62 tests)   [] others: ...      [] Patient's own products: ...    DO NOT test if chemical or biological identity is unknown!     always ask from patient the product information and safety sheets (MSDS)       Order for PRICK TESTS    Reason for tests (suspected allergy): no signs for atopy --> only to be done if recurrences  Known previous allergies: n/a    Standardized prick panels  [x] Atopic panel (20 tests)  [] Pediatric Panel (12 tests)  [] Milk, Meat, Eggs, Grains (20 tests)   [] Dust, Epithelia, Feathers (10 tests)  [] Fish, Seafood, Shellfish (17 tests)  [] Nuts, Beans (14 tests)  [] Spice, Vegetable, Fruit (17 tests)  [] Pollen Panel = Tree, Grass, Weed (24 tests)  [] Others: ...      [] Patient's own  products: ...    DO NOT test if chemical or biological identity is unknown!     always ask from patient the product information and safety sheets (MSDS)     Standardized intradermal tests  [] Penicillium notatum [] Aspergillus fumigatus [] House dust mites D.far & D. pteron  [] Cat    [] dog  [] Others: ...  [] Bee venom   [] Wasp venom  !!Specific protocol with dilutions!!       Order for Drug allergy tests (prick & Intradermal & patch tests)    [] Penicillin G  [] Ampicillin [] Cefazolin   [] Ceftriaxone   [] Ceftazidime  [] Bactrim    [] Others: ...  Order for ... as test date    [] Patient needs consultation with Allergy team (changes of tests may apply)  [x] Tests discussed with Allergy team (can have direct appointment for allergy tests)     ________________________________    Assessment & Plan:    ==> Final Diagnosis:     # recurrent postinflammatory hyperpigmentations with eczematous lesions inguinal and on calfs on dry skin  DDx atopic dermatitis with signs of keratosis pilaris and  Irritant contact dermatitis   DDx allergic contact dermatitis  DDx tinea cruris? --> no clear desquamation and signs for tinea      These conclusions are made at the best of one's knowledge and belief based on the provided evidence such as patient's history and allergy test results and they can change over time or can be incomplete because of missing information's.    ==> Treatment Plan:  - Vanicream soap  - Vanicream cream  - Triamcinolone 0.1 % cream on weekends  - Protopic 0.03 % 1-2 times daily Monday through Friday      Staff:  Provider    Tammy Moyer, MS3    Follow-up primarily in Dermatology in about 6 weeks and if no improvement back to Dermato-Allergy for allergy tests as planned    I spent a total of 30 minutes with El La. This time was spent counseling the patient and/or coordinating care, explaining the allergy tests       Again, thank you for allowing me to participate in the care of your patient.         Sincerely,        Faizan Ricketts MD

## 2023-08-01 NOTE — NURSING NOTE
Dermatology Rooming Note    El La's goals for this visit include:   Chief Complaint   Patient presents with    Allergy Consult     El is here today for a allergy consult. States he has been itching for 2-3 years      ARIELLA Allen

## 2023-09-14 ENCOUNTER — LAB (OUTPATIENT)
Dept: LAB | Facility: CLINIC | Age: 28
End: 2023-09-14
Payer: COMMERCIAL

## 2023-09-14 ENCOUNTER — OFFICE VISIT (OUTPATIENT)
Dept: INTERNAL MEDICINE | Facility: CLINIC | Age: 28
End: 2023-09-14
Payer: COMMERCIAL

## 2023-09-14 VITALS
HEART RATE: 80 BPM | OXYGEN SATURATION: 97 % | WEIGHT: 151.7 LBS | DIASTOLIC BLOOD PRESSURE: 73 MMHG | SYSTOLIC BLOOD PRESSURE: 108 MMHG | BODY MASS INDEX: 22.21 KG/M2

## 2023-09-14 DIAGNOSIS — E78.00 ELEVATED LDL CHOLESTEROL LEVEL: ICD-10-CM

## 2023-09-14 DIAGNOSIS — R21 RASH IN ADULT: Primary | ICD-10-CM

## 2023-09-14 PROBLEM — L24.9 IRRITANT DERMATITIS: Status: ACTIVE | Noted: 2023-09-14

## 2023-09-14 PROBLEM — Z13.6 CARDIOVASCULAR SCREENING; LDL GOAL LESS THAN 160: Status: RESOLVED | Noted: 2023-04-13 | Resolved: 2023-09-14

## 2023-09-14 PROBLEM — R10.13 EPIGASTRIC PAIN: Status: RESOLVED | Noted: 2023-05-29 | Resolved: 2023-09-14

## 2023-09-14 PROBLEM — L24.9 IRRITANT DERMATITIS: Status: RESOLVED | Noted: 2023-09-14 | Resolved: 2023-09-14

## 2023-09-14 PROCEDURE — 36415 COLL VENOUS BLD VENIPUNCTURE: CPT | Performed by: PATHOLOGY

## 2023-09-14 PROCEDURE — 99213 OFFICE O/P EST LOW 20 MIN: CPT | Performed by: HOSPITALIST

## 2023-09-14 PROCEDURE — 83721 ASSAY OF BLOOD LIPOPROTEIN: CPT | Performed by: HOSPITALIST

## 2023-09-14 PROCEDURE — 99000 SPECIMEN HANDLING OFFICE-LAB: CPT | Performed by: PATHOLOGY

## 2023-09-14 ASSESSMENT — ENCOUNTER SYMPTOMS
CHILLS: 0
SHORTNESS OF BREATH: 0
FEVER: 0
CONSTIPATION: 0
DIARRHEA: 0
ABDOMINAL PAIN: 0
DYSURIA: 0
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
## 2023-09-14 NOTE — NURSING NOTE
El La is a 27 year old male that presents in clinic today for the following:     Chief Complaint   Patient presents with    Follow Up     Pt here for follow up       The patient's allergies and medications were reviewed. The patient's vitals were obtained without incident. The patient does not have any other questions for the provider.     Ernst Bear, EMT at 4:39 PM on 9/14/2023.  Primary Care Clinic: [phone]

## 2023-09-14 NOTE — PATIENT INSTRUCTIONS
- Will recheck LDL.   - May hold off on visit to dermatology if rash is better.   - Declined Influenza vaccine today.     Follow up again in 1 year.

## 2023-09-14 NOTE — ASSESSMENT & PLAN NOTE
Patient mentions oils and Himalyian salts helped with rash and stopped all other medications. Likely has eczema.   - Discussed to hold dermatology visit if rash is improved.

## 2023-09-15 LAB — LDLC SERPL DIRECT ASSAY-MCNC: 110 MG/DL

## 2023-09-19 ENCOUNTER — APPOINTMENT (OUTPATIENT)
Dept: INTERPRETER SERVICES | Facility: CLINIC | Age: 28
End: 2023-09-19
Payer: COMMERCIAL

## 2024-04-15 ENCOUNTER — TELEPHONE (OUTPATIENT)
Dept: INTERNAL MEDICINE | Facility: CLINIC | Age: 29
End: 2024-04-15
Payer: COMMERCIAL

## 2024-04-16 ENCOUNTER — TELEPHONE (OUTPATIENT)
Dept: INTERNAL MEDICINE | Facility: CLINIC | Age: 29
End: 2024-04-16
Payer: COMMERCIAL

## 2025-06-09 ENCOUNTER — RESULTS FOLLOW-UP (OUTPATIENT)
Dept: INTERNAL MEDICINE | Facility: CLINIC | Age: 30
End: 2025-06-09

## 2025-06-09 ENCOUNTER — OFFICE VISIT (OUTPATIENT)
Dept: INTERNAL MEDICINE | Facility: CLINIC | Age: 30
End: 2025-06-09
Payer: COMMERCIAL

## 2025-06-09 ENCOUNTER — LAB (OUTPATIENT)
Dept: LAB | Facility: CLINIC | Age: 30
End: 2025-06-09
Payer: COMMERCIAL

## 2025-06-09 VITALS
TEMPERATURE: 98.1 F | BODY MASS INDEX: 23.46 KG/M2 | OXYGEN SATURATION: 97 % | DIASTOLIC BLOOD PRESSURE: 81 MMHG | SYSTOLIC BLOOD PRESSURE: 117 MMHG | RESPIRATION RATE: 16 BRPM | WEIGHT: 163.9 LBS | HEART RATE: 76 BPM | HEIGHT: 70 IN

## 2025-06-09 DIAGNOSIS — Z13.6 SCREENING FOR CARDIOVASCULAR CONDITION: Primary | ICD-10-CM

## 2025-06-09 DIAGNOSIS — Z13.6 SCREENING FOR CARDIOVASCULAR CONDITION: ICD-10-CM

## 2025-06-09 DIAGNOSIS — Z13.1 SCREENING FOR DIABETES MELLITUS: ICD-10-CM

## 2025-06-09 LAB
ALBUMIN SERPL BCG-MCNC: 4.6 G/DL (ref 3.5–5.2)
ALP SERPL-CCNC: 67 U/L (ref 40–150)
ALT SERPL W P-5'-P-CCNC: 14 U/L (ref 0–70)
ANION GAP SERPL CALCULATED.3IONS-SCNC: 12 MMOL/L (ref 7–15)
AST SERPL W P-5'-P-CCNC: 19 U/L (ref 0–45)
BILIRUB SERPL-MCNC: 0.3 MG/DL
BUN SERPL-MCNC: 11.7 MG/DL (ref 6–20)
CALCIUM SERPL-MCNC: 9.4 MG/DL (ref 8.8–10.4)
CHLORIDE SERPL-SCNC: 102 MMOL/L (ref 98–107)
CHOLEST SERPL-MCNC: 191 MG/DL
CREAT SERPL-MCNC: 0.87 MG/DL (ref 0.67–1.17)
EGFRCR SERPLBLD CKD-EPI 2021: >90 ML/MIN/1.73M2
ERYTHROCYTE [DISTWIDTH] IN BLOOD BY AUTOMATED COUNT: 12.6 % (ref 10–15)
EST. AVERAGE GLUCOSE BLD GHB EST-MCNC: 120 MG/DL
FASTING STATUS PATIENT QL REPORTED: YES
FASTING STATUS PATIENT QL REPORTED: YES
GLUCOSE SERPL-MCNC: 97 MG/DL (ref 70–99)
HBA1C MFR BLD: 5.8 %
HCO3 SERPL-SCNC: 24 MMOL/L (ref 22–29)
HCT VFR BLD AUTO: 42.5 % (ref 40–53)
HDLC SERPL-MCNC: 40 MG/DL
HGB BLD-MCNC: 14.3 G/DL (ref 13.3–17.7)
LDLC SERPL CALC-MCNC: 116 MG/DL
MCH RBC QN AUTO: 29.1 PG (ref 26.5–33)
MCHC RBC AUTO-ENTMCNC: 33.6 G/DL (ref 31.5–36.5)
MCV RBC AUTO: 87 FL (ref 78–100)
NONHDLC SERPL-MCNC: 151 MG/DL
PLATELET # BLD AUTO: 249 10E3/UL (ref 150–450)
POTASSIUM SERPL-SCNC: 3.9 MMOL/L (ref 3.4–5.3)
PROT SERPL-MCNC: 7.7 G/DL (ref 6.4–8.3)
RBC # BLD AUTO: 4.91 10E6/UL (ref 4.4–5.9)
SODIUM SERPL-SCNC: 138 MMOL/L (ref 135–145)
TRIGL SERPL-MCNC: 174 MG/DL
WBC # BLD AUTO: 4.1 10E3/UL (ref 4–11)

## 2025-06-09 PROCEDURE — 99000 SPECIMEN HANDLING OFFICE-LAB: CPT | Performed by: PATHOLOGY

## 2025-06-09 PROCEDURE — 80061 LIPID PANEL: CPT | Performed by: PATHOLOGY

## 2025-06-09 PROCEDURE — 3074F SYST BP LT 130 MM HG: CPT | Performed by: HOSPITALIST

## 2025-06-09 PROCEDURE — 36415 COLL VENOUS BLD VENIPUNCTURE: CPT | Performed by: PATHOLOGY

## 2025-06-09 PROCEDURE — 80053 COMPREHEN METABOLIC PANEL: CPT | Performed by: PATHOLOGY

## 2025-06-09 PROCEDURE — 99395 PREV VISIT EST AGE 18-39: CPT | Performed by: HOSPITALIST

## 2025-06-09 PROCEDURE — 3079F DIAST BP 80-89 MM HG: CPT | Performed by: HOSPITALIST

## 2025-06-09 PROCEDURE — 85027 COMPLETE CBC AUTOMATED: CPT | Performed by: PATHOLOGY

## 2025-06-09 PROCEDURE — 83036 HEMOGLOBIN GLYCOSYLATED A1C: CPT | Performed by: HOSPITALIST

## 2025-06-09 SDOH — HEALTH STABILITY: PHYSICAL HEALTH: ON AVERAGE, HOW MANY MINUTES DO YOU ENGAGE IN EXERCISE AT THIS LEVEL?: 30 MIN

## 2025-06-09 SDOH — HEALTH STABILITY: PHYSICAL HEALTH: ON AVERAGE, HOW MANY DAYS PER WEEK DO YOU ENGAGE IN MODERATE TO STRENUOUS EXERCISE (LIKE A BRISK WALK)?: 1 DAY

## 2025-06-09 ASSESSMENT — SOCIAL DETERMINANTS OF HEALTH (SDOH): HOW OFTEN DO YOU GET TOGETHER WITH FRIENDS OR RELATIVES?: MORE THAN THREE TIMES A WEEK

## 2025-06-09 NOTE — PATIENT INSTRUCTIONS
- Labs today for A1c, Lipid, CMP, and CBC.   - Continue regular exercises.  - Obtain Tdap vaccine again at the pharmacy.   - Recommend recheck at the dentist.    Follow up yearly otherwise.
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
